# Patient Record
(demographics unavailable — no encounter records)

---

## 2025-06-18 NOTE — ASSESSMENT
[FreeTextEntry1] : Patient is a 55-year-old male seen in the gastroenterology office prior to scheduling screening colonoscopy.  Patient denied having a prior colonoscopy.  Will schedule patient for colonoscopy for colon cancer screening.  Discussed indication, benefits/risks and alternatives to colonoscopy with the patient.  He reported understanding and he is in agreement with proceeding with colonoscopy.  All of his questions were answered. Would also recommend cessation of tobacco smoking.  Patient was advised that recommendation is for cessation of tobacco smoking.     --Schedule colonoscopy.

## 2025-06-18 NOTE — HISTORY OF PRESENT ILLNESS
[FreeTextEntry1] : Patient is a 55-year-old male referred to gastroenterology by his primary care physician Dr. Baldemar Godfrey for screening colonoscopy.  Patient denies having a prior colonoscopy.  Patient reports no symptoms or complaints during office visit today.  Patient denies experiencing abdominal pain, chest pain, shortness of breath, nausea or vomiting.  Patient denies any acute changes in bowel habits.  Patient denies diarrhea or constipation.  Patient denies red blood in stools and denies black stools.  Patient denies unintentional weight loss.  Past medical history/Past surgical history: Patient reports history of high cholesterol. Patient reports history of impaired glucose levels Patient denies any prior surgeries.  Family history: Patient denies family history of colon cancer or rectal cancer.  Social history: Patient reports smoking 3 to 4 cigarettes per day. Patient denies alcohol use.  Medications: -Metformin. -Aspirin. -Simvastatin. -Losartan.  Allergies: Patient reports no known drug allergies.